# Patient Record
Sex: MALE | Employment: FULL TIME | URBAN - METROPOLITAN AREA
[De-identification: names, ages, dates, MRNs, and addresses within clinical notes are randomized per-mention and may not be internally consistent; named-entity substitution may affect disease eponyms.]

---

## 2022-11-28 ENCOUNTER — TELEPHONE (OUTPATIENT)
Dept: UROLOGY | Facility: CLINIC | Age: 67
End: 2022-11-28

## 2022-11-28 NOTE — TELEPHONE ENCOUNTER
[facility] Call Center    Phone Message    May a detailed message be left on voicemail: yes     Reason for Call: Pt having trouble urinating but per wife is dribbling some and on medication. He also has burning while urinating. Writer unable to scheduled until March. Please call pt to set up appt. Thank you    Action Taken: Message routed to:  Other: Uro    Travel Screening: Not Applicable

## 2022-11-28 NOTE — TELEPHONE ENCOUNTER
Called patient left voicemail to return call to clinic.      Can offer Dec. 8th at 11 am.     When patient calls back ask if patient is having pain or burning while urinating, Frequent urination, Feeling the need to urinate despite having an empty bladder,Bloody urine, Pressure or cramping in the groin or lower abdomen.    Sent routing comment to Triage nurse.    Jennifer Alexander LPN on 11/28/2022 at 12:14 PM

## 2022-11-28 NOTE — TELEPHONE ENCOUNTER
Spoke to pt's spouse. She declines to discuss further, because pt has an appointment with [facility] instead.     Phylicia Eldridge RN MSN